# Patient Record
Sex: MALE | Race: WHITE | NOT HISPANIC OR LATINO | Employment: OTHER | URBAN - METROPOLITAN AREA
[De-identification: names, ages, dates, MRNs, and addresses within clinical notes are randomized per-mention and may not be internally consistent; named-entity substitution may affect disease eponyms.]

---

## 2017-06-07 ENCOUNTER — HISTORICAL (OUTPATIENT)
Dept: ANESTHESIOLOGY | Facility: HOSPITAL | Age: 72
End: 2017-06-07

## 2018-01-02 ENCOUNTER — HISTORICAL (OUTPATIENT)
Dept: ADMINISTRATIVE | Facility: HOSPITAL | Age: 73
End: 2018-01-02

## 2018-01-25 ENCOUNTER — HISTORICAL (OUTPATIENT)
Dept: RADIOLOGY | Facility: HOSPITAL | Age: 73
End: 2018-01-25

## 2020-03-04 ENCOUNTER — HISTORICAL (OUTPATIENT)
Dept: PREADMISSION TESTING | Facility: HOSPITAL | Age: 75
End: 2020-03-04

## 2020-03-04 LAB
ABS NEUT (OLG): 2.4 X10(3)/MCL (ref 2.1–9.2)
ALBUMIN SERPL-MCNC: 4.2 GM/DL (ref 3.4–5)
ALBUMIN/GLOB SERPL: 1.3 {RATIO}
ALP SERPL-CCNC: 87 UNIT/L (ref 50–136)
ALT SERPL-CCNC: 43 UNIT/L (ref 12–78)
AST SERPL-CCNC: 73 UNIT/L (ref 15–37)
BASOPHILS # BLD AUTO: 0.1 X10(3)/MCL (ref 0–0.2)
BASOPHILS NFR BLD AUTO: 2 %
BILIRUB SERPL-MCNC: 0.9 MG/DL (ref 0.2–1)
BILIRUBIN DIRECT+TOT PNL SERPL-MCNC: 0.2 MG/DL (ref 0–0.2)
BILIRUBIN DIRECT+TOT PNL SERPL-MCNC: 0.7 MG/DL (ref 0–0.8)
BUN SERPL-MCNC: 6 MG/DL (ref 7–18)
CALCIUM SERPL-MCNC: 9.2 MG/DL (ref 8.5–10.1)
CHLORIDE SERPL-SCNC: 101 MMOL/L (ref 98–107)
CO2 SERPL-SCNC: 25 MMOL/L (ref 21–32)
CREAT SERPL-MCNC: 0.82 MG/DL (ref 0.7–1.3)
EOSINOPHIL # BLD AUTO: 0.2 X10(3)/MCL (ref 0–0.9)
EOSINOPHIL NFR BLD AUTO: 3 %
ERYTHROCYTE [DISTWIDTH] IN BLOOD BY AUTOMATED COUNT: 13.2 % (ref 11.5–17)
GLOBULIN SER-MCNC: 3.3 GM/DL (ref 2.4–3.5)
GLUCOSE SERPL-MCNC: 96 MG/DL (ref 74–106)
HCT VFR BLD AUTO: 44.5 % (ref 42–52)
HGB BLD-MCNC: 14.7 GM/DL (ref 14–18)
LYMPHOCYTES # BLD AUTO: 1.9 X10(3)/MCL (ref 0.6–4.6)
LYMPHOCYTES NFR BLD AUTO: 36 %
MCH RBC QN AUTO: 29.1 PG (ref 27–31)
MCHC RBC AUTO-ENTMCNC: 33 GM/DL (ref 33–36)
MCV RBC AUTO: 87.9 FL (ref 80–94)
MONOCYTES # BLD AUTO: 0.7 X10(3)/MCL (ref 0.1–1.3)
MONOCYTES NFR BLD AUTO: 13 %
NEUTROPHILS # BLD AUTO: 2.4 X10(3)/MCL (ref 2.1–9.2)
NEUTROPHILS NFR BLD AUTO: 46 %
PLATELET # BLD AUTO: 193 X10(3)/MCL (ref 130–400)
PMV BLD AUTO: 8.6 FL (ref 9.4–12.4)
POTASSIUM SERPL-SCNC: 3.8 MMOL/L (ref 3.5–5.1)
PROT SERPL-MCNC: 7.5 GM/DL (ref 6.4–8.2)
RBC # BLD AUTO: 5.06 X10(6)/MCL (ref 4.7–6.1)
SODIUM SERPL-SCNC: 136 MMOL/L (ref 136–145)
WBC # SPEC AUTO: 5.2 X10(3)/MCL (ref 4.5–11.5)

## 2020-05-26 LAB
ABS NEUT (OLG): 4.54 X10(3)/MCL (ref 2.1–9.2)
ALBUMIN SERPL-MCNC: 4.4 GM/DL (ref 3.4–4.8)
ALBUMIN/GLOB SERPL: 1.4 RATIO (ref 1.1–2)
ALP SERPL-CCNC: 81 UNIT/L (ref 40–150)
ALT SERPL-CCNC: 101 UNIT/L (ref 0–55)
AST SERPL-CCNC: 157 UNIT/L (ref 5–34)
BASOPHILS # BLD AUTO: 0.1 X10(3)/MCL (ref 0–0.2)
BASOPHILS NFR BLD AUTO: 2 %
BILIRUB SERPL-MCNC: 1.1 MG/DL
BILIRUBIN DIRECT+TOT PNL SERPL-MCNC: 0.5 MG/DL (ref 0–0.5)
BILIRUBIN DIRECT+TOT PNL SERPL-MCNC: 0.6 MG/DL (ref 0–0.8)
BUN SERPL-MCNC: 6.7 MG/DL (ref 8.4–25.7)
CALCIUM SERPL-MCNC: 9.2 MG/DL (ref 8.8–10)
CHLORIDE SERPL-SCNC: 98 MMOL/L (ref 98–107)
CO2 SERPL-SCNC: 21 MMOL/L (ref 23–31)
CREAT SERPL-MCNC: 0.79 MG/DL (ref 0.73–1.18)
EOSINOPHIL # BLD AUTO: 0.1 X10(3)/MCL (ref 0–0.9)
EOSINOPHIL NFR BLD AUTO: 1 %
ERYTHROCYTE [DISTWIDTH] IN BLOOD BY AUTOMATED COUNT: 13.1 % (ref 11.5–17)
GLOBULIN SER-MCNC: 3.1 GM/DL (ref 2.4–3.5)
GLUCOSE SERPL-MCNC: 78 MG/DL (ref 82–115)
HCT VFR BLD AUTO: 43.7 % (ref 42–52)
HGB BLD-MCNC: 14.4 GM/DL (ref 14–18)
LYMPHOCYTES # BLD AUTO: 1.2 X10(3)/MCL (ref 0.6–4.6)
LYMPHOCYTES NFR BLD AUTO: 17 %
MCH RBC QN AUTO: 29.3 PG (ref 27–31)
MCHC RBC AUTO-ENTMCNC: 33 GM/DL (ref 33–36)
MCV RBC AUTO: 89 FL (ref 80–94)
MONOCYTES # BLD AUTO: 0.8 X10(3)/MCL (ref 0.1–1.3)
MONOCYTES NFR BLD AUTO: 11 %
NEUTROPHILS # BLD AUTO: 4.54 X10(3)/MCL (ref 2.1–9.2)
NEUTROPHILS NFR BLD AUTO: 68 %
PLATELET # BLD AUTO: 238 X10(3)/MCL (ref 130–400)
PMV BLD AUTO: 8.7 FL (ref 9.4–12.4)
POTASSIUM SERPL-SCNC: 4.1 MMOL/L (ref 3.5–5.1)
PROT SERPL-MCNC: 7.5 GM/DL (ref 5.8–7.6)
RBC # BLD AUTO: 4.91 X10(6)/MCL (ref 4.7–6.1)
SARS-COV-2 RNA RESP QL NAA+PROBE: NOT DETECTED
SODIUM SERPL-SCNC: 134 MMOL/L (ref 136–145)
WBC # SPEC AUTO: 6.7 X10(3)/MCL (ref 4.5–11.5)

## 2020-05-29 ENCOUNTER — HISTORICAL (OUTPATIENT)
Dept: SURGERY | Facility: HOSPITAL | Age: 75
End: 2020-05-29

## 2020-07-31 ENCOUNTER — HISTORICAL (OUTPATIENT)
Dept: ADMINISTRATIVE | Facility: HOSPITAL | Age: 75
End: 2020-07-31

## 2020-07-31 LAB
ABS NEUT (OLG): 7.4 X10(3)/MCL (ref 2.1–9.2)
ALBUMIN SERPL-MCNC: 4.3 GM/DL (ref 3.4–5)
ALBUMIN/GLOB SERPL: 1.3 RATIO (ref 1.1–2)
ALP SERPL-CCNC: 88 UNIT/L (ref 40–150)
ALT SERPL-CCNC: 80 UNIT/L (ref 0–55)
ANTINUCLEAR ANTIBODY SCREEN (OHS): NEGATIVE
AST SERPL-CCNC: 137 UNIT/L (ref 5–34)
BASOPHILS # BLD AUTO: 0.1 X10(3)/MCL (ref 0–0.2)
BASOPHILS NFR BLD AUTO: 1 %
BILIRUB SERPL-MCNC: 1.3 MG/DL
BILIRUBIN DIRECT+TOT PNL SERPL-MCNC: 0.6 MG/DL (ref 0–0.5)
BILIRUBIN DIRECT+TOT PNL SERPL-MCNC: 0.7 MG/DL (ref 0–0.8)
BUN SERPL-MCNC: 7.1 MG/DL (ref 8.4–25.7)
CALCIUM SERPL-MCNC: 9.4 MG/DL (ref 8.8–10)
CHLORIDE SERPL-SCNC: 99 MMOL/L (ref 98–107)
CK SERPL-CCNC: 70 U/L (ref 30–200)
CO2 SERPL-SCNC: 22 MMOL/L (ref 23–31)
CREAT SERPL-MCNC: 0.8 MG/DL (ref 0.73–1.18)
DSDNA ANTIBODY (OHS): NEGATIVE
EOSINOPHIL # BLD AUTO: 0 X10(3)/MCL (ref 0–0.9)
EOSINOPHIL NFR BLD AUTO: 0 %
ERYTHROCYTE [DISTWIDTH] IN BLOOD BY AUTOMATED COUNT: 12.9 % (ref 11.5–17)
FERRITIN SERPL-MCNC: 291.72 NG/ML (ref 21.81–274.66)
GLOBULIN SER-MCNC: 3.2 GM/DL (ref 2.4–3.5)
GLUCOSE SERPL-MCNC: 93 MG/DL (ref 82–115)
HAV AB SER QL IA: NONREACTIVE
HAV IGM SERPL QL IA: NONREACTIVE
HBV CORE IGM SERPL QL IA: NONREACTIVE
HBV SURFACE AB SER-ACNC: 0.15 M[IU]/ML
HBV SURFACE AB SERPL IA-ACNC: NONREACTIVE M[IU]/ML
HBV SURFACE AG SERPL QL IA: NONREACTIVE
HCT VFR BLD AUTO: 46.9 % (ref 42–52)
HCV AB SERPL QL IA: NONREACTIVE
HGB BLD-MCNC: 15.5 GM/DL (ref 14–18)
INR PPP: 1.2 (ref 0–1.3)
IRON SATN MFR SERPL: 26 % (ref 20–50)
IRON SERPL-MCNC: 95 UG/DL (ref 65–175)
LYMPHOCYTES # BLD AUTO: 0.7 X10(3)/MCL (ref 0.6–4.6)
LYMPHOCYTES NFR BLD AUTO: 8 %
MCH RBC QN AUTO: 30.2 PG (ref 27–31)
MCHC RBC AUTO-ENTMCNC: 33 GM/DL (ref 33–36)
MCV RBC AUTO: 91.4 FL (ref 80–94)
MONOCYTES # BLD AUTO: 0.8 X10(3)/MCL (ref 0.1–1.3)
MONOCYTES NFR BLD AUTO: 8 %
NEUTROPHILS # BLD AUTO: 7.4 X10(3)/MCL (ref 2.1–9.2)
NEUTROPHILS NFR BLD AUTO: 82 %
PLATELET # BLD AUTO: 257 X10(3)/MCL (ref 130–400)
PMV BLD AUTO: 8.4 FL (ref 9.4–12.4)
POTASSIUM SERPL-SCNC: 3.9 MMOL/L (ref 3.5–5.1)
PROT SERPL-MCNC: 7.5 GM/DL (ref 5.8–7.6)
PROTHROMBIN TIME: 14.3 SECOND(S) (ref 11.1–13.7)
RBC # BLD AUTO: 5.13 X10(6)/MCL (ref 4.7–6.1)
SODIUM SERPL-SCNC: 135 MMOL/L (ref 136–145)
TIBC SERPL-MCNC: 265 UG/DL (ref 69–240)
TIBC SERPL-MCNC: 360 UG/DL (ref 250–450)
TRANSFERRIN SERPL-MCNC: 316 MG/DL (ref 163–344)
TSH SERPL-ACNC: 0.72 UIU/ML (ref 0.35–4.94)
WBC # SPEC AUTO: 9 X10(3)/MCL (ref 4.5–11.5)

## 2020-09-15 ENCOUNTER — HISTORICAL (OUTPATIENT)
Dept: ADMINISTRATIVE | Facility: HOSPITAL | Age: 75
End: 2020-09-15

## 2020-09-15 LAB
APPEARANCE, UA: ABNORMAL
BACTERIA #/AREA URNS AUTO: ABNORMAL /HPF
BILIRUB UR QL STRIP: ABNORMAL
COLOR UR: ABNORMAL
GLUCOSE (UA): NEGATIVE
HGB UR QL STRIP: NEGATIVE
KETONES UR QL STRIP: ABNORMAL
LEUKOCYTE ESTERASE UR QL STRIP: ABNORMAL
MUCOUS THREADS URNS QL MICRO: ABNORMAL
NITRITE UR QL STRIP.AUTO: POSITIVE
PH UR STRIP: 6.5 [PH] (ref 5–9)
PROT UR QL STRIP: ABNORMAL
RBC #/AREA URNS HPF: ABNORMAL /HPF
SP GR UR STRIP: 1.02 (ref 1–1.03)
SQUAMOUS EPITHELIAL, UA: 6 /HPF (ref 0–4)
UROBILINOGEN UR STRIP-ACNC: 1
WBC #/AREA URNS AUTO: 9 /HPF (ref 0–3)

## 2020-09-21 ENCOUNTER — HISTORICAL (OUTPATIENT)
Dept: ADMINISTRATIVE | Facility: HOSPITAL | Age: 75
End: 2020-09-21

## 2020-09-21 LAB
APPEARANCE, UA: CLEAR
BACTERIA SPEC CULT: NORMAL /HPF
BILIRUB UR QL STRIP: NEGATIVE
COLOR UR: YELLOW
GLUCOSE (UA): NEGATIVE
HGB UR QL STRIP: NEGATIVE
KETONES UR QL STRIP: NEGATIVE
LEUKOCYTE ESTERASE UR QL STRIP: NEGATIVE
NITRITE UR QL STRIP: NEGATIVE
PH UR STRIP: 6 [PH] (ref 5–9)
PROT UR QL STRIP: NEGATIVE
RBC #/AREA URNS HPF: NORMAL /[HPF]
SP GR UR STRIP: 1.01 (ref 1–1.03)
SQUAMOUS EPITHELIAL, UA: NORMAL
UROBILINOGEN UR STRIP-ACNC: 0.2
WBC #/AREA URNS HPF: NORMAL /[HPF]

## 2020-09-23 LAB — FINAL CULTURE: NO GROWTH

## 2022-03-10 ENCOUNTER — HISTORICAL (OUTPATIENT)
Dept: ADMINISTRATIVE | Facility: HOSPITAL | Age: 77
End: 2022-03-10

## 2022-03-10 LAB
AFP-TM SERPL-MCNC: 5 NG/ML
ALBUMIN SERPL-MCNC: 3.9 G/DL (ref 3.4–4.8)
ALBUMIN/GLOB SERPL: 1.2 {RATIO} (ref 1.1–2)
ALP SERPL-CCNC: 76 U/L (ref 40–150)
ALT SERPL-CCNC: 24 U/L (ref 0–55)
AMMONIA PLAS-MSCNC: 23.6 (ref 18–72)
APTT PPP: 33.4 S (ref 23.2–33.7)
AST SERPL-CCNC: 42 U/L (ref 5–34)
BILIRUB SERPL-MCNC: 1.7 MG/DL
BILIRUBIN DIRECT+TOT PNL SERPL-MCNC: 0.6 (ref 0–0.5)
BILIRUBIN DIRECT+TOT PNL SERPL-MCNC: 1.1 (ref 0–0.8)
BUN SERPL-MCNC: 6.6 MG/DL (ref 8.4–25.7)
CALCIUM SERPL-MCNC: 9.4 MG/DL (ref 8.7–10.5)
CHLORIDE SERPL-SCNC: 93 MMOL/L (ref 98–107)
CO2 SERPL-SCNC: 25 MMOL/L (ref 23–31)
CREAT SERPL-MCNC: 0.82 MG/DL (ref 0.73–1.18)
ERYTHROCYTE [DISTWIDTH] IN BLOOD BY AUTOMATED COUNT: 13 % (ref 11.5–17)
GLOBULIN SER-MCNC: 3.3 G/DL (ref 2.4–3.5)
GLUCOSE SERPL-MCNC: 137 MG/DL (ref 82–115)
HCT VFR BLD AUTO: 43.6 % (ref 42–52)
HEMOLYSIS INTERF INDEX SERPL-ACNC: 34
HGB BLD-MCNC: 15.5 G/DL (ref 14–18)
ICTERIC INTERF INDEX SERPL-ACNC: 1
INR PPP: 1.2 (ref 0–1.3)
LIPEMIC INTERF INDEX SERPL-ACNC: 2
MCH RBC QN AUTO: 29.9 PG (ref 27–31)
MCHC RBC AUTO-ENTMCNC: 35.6 G/DL (ref 33–36)
MCV RBC AUTO: 84.2 FL (ref 80–94)
PLATELET # BLD AUTO: 207 10*3/UL (ref 130–400)
PMV BLD AUTO: 8.6 FL (ref 9.4–12.4)
POTASSIUM SERPL-SCNC: 4 MMOL/L (ref 3.5–5.1)
PROT SERPL-MCNC: 7.2 G/DL (ref 5.8–7.6)
PROTHROMBIN TIME: 14.4 S (ref 12.5–14.5)
RBC # BLD AUTO: 5.18 10*6/UL (ref 4.7–6.1)
SODIUM SERPL-SCNC: 129 MMOL/L (ref 136–145)
WBC # SPEC AUTO: 4.7 10*3/UL (ref 4.5–11.5)

## 2022-04-29 NOTE — OP NOTE
DATE OF SURGERY:    05/29/2020    SURGEON:  Pérez Mantilla MD    PREOPERATIVE DIAGNOSIS:  Mechanical failure of inflatable penile prosthesis.    POSTOPERATIVE DIAGNOSIS:  Mechanical failure of inflatable penile prosthesis.    PROCEDURES:    1. Removal of inflatable penile prosthesis.  2. Placement of inflatable penile prosthesis with Titan Coloplast device.    FINDINGS:  The patient had a hole in the tubing leading to the reservoir.  I left his original reservoir in place and removed the cylinders in the pump and replaced it with 18 cm Titan cylinders with a 1 cm rear tip extender on the left and a 1.5 cm rear-tip extender on the right, and a 75 cc reservoir in the right extraperitoneal abdominal location.    OPERATIVE NOTE:  After informed consent was obtained, the patient was taken to the operating room.  After receiving a preoperative dose of vancomycin and gentamicin, he was given a general anesthetic in supine position.  His abdomen, genitals, perineum, and lower extremities above the knees were all prepped and draped in a sterile fashion.  I placed a 16-Turkmen catheter through the urethra into the bladder.  I then used a Min deep scrotal retractor to place tension on the penis.  I used the cutting current of the cautery to open the previous penoscrotal incision.  I divided the scrotal layers down to the corporal bodies, as well as to expose the urethra to prevent injury.  I opened up the pseudocapsule space of the pump and brought out the pump.  I followed the tubing to the corpora on both sides.  Using the cutting current on the cautery, I made corporotomies just above the insertion of the tubing.  I placed stay sutures of 2-0 Vicryl on each side of the incisions.  In the corpora, I used a vein retractor to pull out the cylinders and the rear-tip extenders.  I attempted to remove the reservoir.  When I was following the tubing back to the reservoir, the tubing had broken, and the only thing  connecting was a monofilament string.  I cut the string and left the old reservoir in place.  At this time, I washed out all the pseudocapsule spaces with half-strength peroxide, half-strength Betadine, rifampin and gentamicin.  I then prepped an 18 cm reservoir on the back table and soaked it in concentrated rifampin and gentamicin.  I placed a 1 cm rear-tip extender on the left side, a 1.5 cm rear-tip extender on the right side, which is what his previous measurements were.  I used the Neha  to pass the needle and strings through the distal corporal body and glans on both sides.  I placed the proximal ends of the cylinders into place and pulled the distal ends in place with the strings.  I closed the corporotomies with the previously placed Vicryl stay sutures.  I prepped a 75 cc reservoir on the back table and soaked it in concentrated rifampin and gentamicin.  I used a long nasal speculum to perforate the transversalis fascia just above the right pubic tubercle.  I placed the reservoir in the extraperitoneal location in the abdominal wall, and I filled it with 75 cc of isotonic saline.  Using the quick connectors, I connected the reservoir to the pump, which was placed in the dependent portion of the scrotum.  I placed concentrated rifampin and gentamicin into the scrotal wound.  I closed the scrotum with a running 2-0 Vicryl suture for the dartos fascia, followed by a running 4-0 Monocryl subcuticular stitch for the skin.  He tolerated the procedure well.  There were no apparent complications.  He was extubated and brought to recovery in good condition.        ______________________________  MD SWAPNIL Fowler/JERROD  DD:  05/29/2020  Time:  04:01PM  DT:  05/29/2020  Time:  08:17PM  Job #:  [id number]

## 2022-04-29 NOTE — OP NOTE
Patient:   Kiet Engel            MRN: [id number]            FIN: [id number]               Age:   72 years     Sex:  Male     :  1945   Associated Diagnoses:   None   Author:   Jerry Robertson MD       Phacoemulsification of Cataract with Intraocular Implant   Preoperative Diagnosis: Cataract right eye   Postoperative Diagnosis : Cataract right eye  Surgeon: Jerry Robertson MD  Assistant: DONTA Jones  Anestheisa: Topical  Complications: None  After the patient underwent topical anesthesia along with IV sedation in the holding area, the patient was brought to the operating suite. The patient was prepped and draped in a sterile fashion. A pediatric tegaderm and a lid speculum were used to retract the upper and lower lashes and lids.  A 1.0mm paracentesis was then made at the 11 oclock position. Intraocular non-preserved 1% Xylocaine (4% diluted down to 1%) was irrigated into the anterior chamber. Trypan blue dye was used to stain the anterior capsule then Endocoat was injected into the eye. A clear corneal incision was made with a 2.4 Keratome blade. A 4.75mm circular capsulotomy was then made with a pre bent 30 gauge needle and BSS was used to hydro dissect the nucleus from the capsule. The nucleus was then phacoemulsified with the Abbott machine for a EFX of 32. The cortex was then removed with the I/A hand piece and Helon was placed into the posterior bag of the eye. An posterior chamber implant [id number] of power 20.0 was placed in the capsular bag. The Helon was then removed from the eye with the I/A hand piece . The anterior chamber was inflated with BSS and the wound was checked for leaks. The lid speculum was removed and a drop of Besivance was placed in the operative eye. The patient was brought to the recovery suite in stable condition.         2018 @ [facility] [place]

## 2022-06-08 DIAGNOSIS — R97.21 INCREASING PROSTATE SPECIFIC ANTIGEN (PSA) LEVEL AFTER TREATMENT FOR MALIGNANT NEOPLASM OF PROSTATE: ICD-10-CM

## 2022-06-08 DIAGNOSIS — C61 MALIGNANT NEOPLASM OF PROSTATE: ICD-10-CM

## 2022-06-08 DIAGNOSIS — R90.0 INTRACRANIAL MASS: Primary | ICD-10-CM

## 2023-01-05 DIAGNOSIS — C61 PROSTATE CA: Primary | ICD-10-CM

## 2023-07-12 DIAGNOSIS — C61 MALIGNANT NEOPLASM OF PROSTATE: Primary | ICD-10-CM

## 2023-12-04 DIAGNOSIS — C61 MALIGNANT NEOPLASM OF PROSTATE: Primary | ICD-10-CM
